# Patient Record
Sex: MALE | Race: WHITE | NOT HISPANIC OR LATINO | Employment: FULL TIME | URBAN - METROPOLITAN AREA
[De-identification: names, ages, dates, MRNs, and addresses within clinical notes are randomized per-mention and may not be internally consistent; named-entity substitution may affect disease eponyms.]

---

## 2018-07-11 ENCOUNTER — OFFICE VISIT - HEALTHEAST (OUTPATIENT)
Dept: FAMILY MEDICINE | Facility: CLINIC | Age: 23
End: 2018-07-11

## 2018-07-11 DIAGNOSIS — B07.8 OTHER VIRAL WARTS: ICD-10-CM

## 2018-07-11 ASSESSMENT — MIFFLIN-ST. JEOR: SCORE: 1990.29

## 2018-07-27 ENCOUNTER — OFFICE VISIT - HEALTHEAST (OUTPATIENT)
Dept: FAMILY MEDICINE | Facility: CLINIC | Age: 23
End: 2018-07-27

## 2018-07-27 DIAGNOSIS — B07.8 OTHER VIRAL WARTS: ICD-10-CM

## 2018-07-27 DIAGNOSIS — S90.121A CONTUSION OF LESSER TOE OF RIGHT FOOT WITHOUT DAMAGE TO NAIL, INITIAL ENCOUNTER: ICD-10-CM

## 2018-07-27 ASSESSMENT — MIFFLIN-ST. JEOR: SCORE: 2004.81

## 2019-04-12 ENCOUNTER — OFFICE VISIT - HEALTHEAST (OUTPATIENT)
Dept: FAMILY MEDICINE | Facility: CLINIC | Age: 24
End: 2019-04-12

## 2019-04-12 DIAGNOSIS — Z79.899 CONTROLLED SUBSTANCE AGREEMENT SIGNED: ICD-10-CM

## 2019-04-12 DIAGNOSIS — F98.8 ATTENTION DEFICIT DISORDER (ADD) IN ADULT: ICD-10-CM

## 2019-05-21 ENCOUNTER — OFFICE VISIT - HEALTHEAST (OUTPATIENT)
Dept: FAMILY MEDICINE | Facility: CLINIC | Age: 24
End: 2019-05-21

## 2019-05-21 DIAGNOSIS — F98.8 ATTENTION DEFICIT DISORDER (ADD) WITHOUT HYPERACTIVITY: ICD-10-CM

## 2019-05-21 RX ORDER — DEXTROAMPHETAMINE SACCHARATE, AMPHETAMINE ASPARTATE, DEXTROAMPHETAMINE SULFATE AND AMPHETAMINE SULFATE 3.75; 3.75; 3.75; 3.75 MG/1; MG/1; MG/1; MG/1
15 TABLET ORAL DAILY
Qty: 30 TABLET | Refills: 0 | Status: SHIPPED | OUTPATIENT
Start: 2019-05-21

## 2019-06-19 ENCOUNTER — OFFICE VISIT - HEALTHEAST (OUTPATIENT)
Dept: BEHAVIORAL HEALTH | Facility: CLINIC | Age: 24
End: 2019-06-19

## 2019-06-19 DIAGNOSIS — F98.8 ATTENTION DEFICIT DISORDER (ADD) IN ADULT: ICD-10-CM

## 2019-06-19 DIAGNOSIS — F43.20 ADJUSTMENT DISORDER, UNSPECIFIED: ICD-10-CM

## 2020-01-08 ENCOUNTER — AMBULATORY - HEALTHEAST (OUTPATIENT)
Dept: BEHAVIORAL HEALTH | Facility: CLINIC | Age: 25
End: 2020-01-08

## 2021-05-26 ENCOUNTER — RECORDS - HEALTHEAST (OUTPATIENT)
Dept: ADMINISTRATIVE | Facility: CLINIC | Age: 26
End: 2021-05-26

## 2021-05-27 NOTE — PROGRESS NOTES
"ASSESSMENT/PLAN:  1. Attention deficit disorder (ADD) in adult  Ambulatory referral to Psychology    dextroamphetamine-amphetamine (ADDERALL XR) 20 MG 24 hr capsule   2. Controlled substance agreement signed         This is a 22 yo male, trying to finish his education degree at the [employer].  He is finding that his inability to focus/concentrate is making it difficult to finish.  He has tried stimulants in the past to focus - reports this was helpful.  We discussed treating \"the right diagnosis\" and I'm insistent that he get an appointment for an assessment.  In the meanwhile I will start Adderall XR to see if we can help in the short-term.  He has signed a non-opioid agreement.    Return in about 1 month (around 5/12/2019).    We have spent a total of 25 minutes together today - all of this time in counseling regarding his (presumed) ADHD symptoms  There are no discontinued medications.  There are no Patient Instructions on file for this visit.    Chief Complaint:  Chief Complaint   Patient presents with     ADHD     pt states possible ADHD, has a hard time focusing        HPI:   Placido Daredn is a 23 y.o. 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  Throughout undergraduate studies - would use Adderall  -   Hasn't used anything for the last year  Deadlines for everything - really late on everything  Huge impact in student teaching  Can't complete this massive project - nothing gets done    Last week was spring break - planned task 1 for break - nothing got done  Went to his parents for help - " "like days of old - even that didn't help    Lots of lesson planning  Daily/weekly routine is thrown off  Exercise is \"off\" - was going to lift 2 hours, 4 times/week  Hasn't done any of that - inability to focus and complete work    Never had testing  In middle school /high school - felt like symptoms applied to him  Got his work done -   Didn't want to rely on it              PMH:   Patient Active Problem List    Diagnosis Date Noted     Other viral warts 07/11/2018     History reviewed. No pertinent past medical history.  History reviewed. No pertinent surgical history.  Social History     Socioeconomic History     Marital status: Single     Spouse name: Not on file     Number of children: Not on file     Years of education: Not on file     Highest education level: Not on file   Occupational History     Not on file   Social Needs     Financial resource strain: Not on file     Food insecurity:     Worry: Not on file     Inability: Not on file     Transportation needs:     Medical: Not on file     Non-medical: Not on file   Tobacco Use     Smoking status: Never Smoker     Smokeless tobacco: Never Used   Substance and Sexual Activity     Alcohol use: Not on file     Drug use: Not on file     Sexual activity: Not on file   Lifestyle     Physical activity:     Days per week: Not on file     Minutes per session: Not on file     Stress: Not on file   Relationships     Social connections:     Talks on phone: Not on file     Gets together: Not on file     Attends Mosque service: Not on file     Active member of club or organization: Not on file     Attends meetings of clubs or organizations: Not on file     Relationship status: Not on file     Intimate partner violence:     Fear of current or ex partner: Not on file     Emotionally abused: Not on file     Physically abused: Not on file     Forced sexual activity: Not on file   Other Topics Concern     Not on file   Social History Narrative     Not on file     History " reviewed. No pertinent family history.    Meds:    Current Outpatient Medications:      dextroamphetamine-amphetamine (ADDERALL XR) 20 MG 24 hr capsule, Take 1 capsule (20 mg total) by mouth daily., Disp: 30 capsule, Rfl: 0    Allergies:  No Known Allergies    ROS:  Pertinent positives as noted in HPI; otherwise 12 point ROS negative.      Physical Exam:  EXAM:  /70 (Patient Site: Left Arm, Patient Position: Sitting, Cuff Size: Adult Regular)   Pulse (!) 104   Resp 20   Wt 178 lb 8 oz (81 kg)   BMI 23.23 kg/m     Gen:  NAD, appears well, well-hydrated  HEENT:  TMs nl, oropharynx benign, nasal mucosa nl, conjunctiva clear  Neck:  Supple, no adenopathy, no thyromegaly, no carotid bruits, no JVD  Lungs:  Clear to auscultation bilaterally  Cor:  RRR no murmur  Abd:  Soft, nontender, BS+, no masses, no guarding or rebound, no HSM  Extr:  Neg.  Neuro:  No asymmetry  Skin:  Warm/dry        Results:  Results for orders placed or performed in visit on 06/27/14   Rapid Strep A Screen-Throat   Result Value Ref Range    Rapid Strep A Antigen No Group A Strep detected No Group A Strep detected   Group A Strep, RNA Direct Detection, Throat   Result Value Ref Range    Group A Strep by PCR No Group A Strep rRNA detected No Group A Strep rRNA detected

## 2021-05-29 ENCOUNTER — RECORDS - HEALTHEAST (OUTPATIENT)
Dept: ADMINISTRATIVE | Facility: CLINIC | Age: 26
End: 2021-05-29

## 2021-05-29 NOTE — PROGRESS NOTES
"ASSESSMENT/PLAN:  1. 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  He now is uncertain what he'll do beyond that.  He has been using the Adderall XR but feels like this is too large a dose - feels like he wasn't sleeping well with this dose - and felt like it lasted too long.  We had a long discussion about decreasing dose/changing to short acting tablets (either/and).  After this, we agreed upon lower dose (15 mg) and short acting tablet.  He agrees to this change.  He will also follow through with his evaluation with psychologist regarding this diagnosis.  We will see him in follow up after that visit.   Return in about 6 weeks (around 7/2/2019).      Medications Discontinued During This Encounter   Medication Reason     dextroamphetamine-amphetamine (ADDERALL XR) 20 MG 24 hr capsule Alternate therapy     There are no Patient Instructions on file for this visit.    Chief Complaint:  Chief Complaint   Patient presents with     Medication follow up       HPI:   Placido Darden is a 23 y.o. 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 No pertinent past medical history.  History reviewed. No pertinent surgical history.  Social History     Socioeconomic History     Marital status: Single     Spouse name: Not on file     Number of children: Not on file     Years of education: Not on file     Highest education level: Not on file   Occupational History     Not on file   Social Needs     Financial resource strain: Not on file     Food insecurity:     Worry: Not on file     Inability: Not on file     Transportation needs:     Medical: Not on file     Non-medical: Not on file   Tobacco Use     Smoking status: Never Smoker     Smokeless tobacco: Never Used   Substance and Sexual Activity     Alcohol use: Not on file     Drug use: Not on file     Sexual activity: Not on file   Lifestyle     Physical activity:     Days per week: Not on file     Minutes per session: Not on file     Stress: Not on file   Relationships     Social connections:     Talks on phone: Not on file     Gets together: Not on file     Attends Caodaism service: Not on file     Active member of club or organization: Not on file     Attends meetings of clubs or organizations: Not on file     Relationship status: Not on file     Intimate partner violence:     Fear of current or ex partner: Not on file     Emotionally abused: Not on file     Physically abused: Not on file     Forced sexual activity: Not on file   Other Topics Concern     Not on file   Social History Narrative     Not on file     History reviewed. No pertinent family history.    Meds:    Current Outpatient Medications:      dextroamphetamine-amphetamine 15 mg Tab, Take 15 mg by mouth daily., Disp: 30 tablet, Rfl: 0    Allergies:  No Known Allergies    ROS:  Pertinent positives  as noted in HPI; otherwise 12 point ROS negative.      Physical Exam:  EXAM:  /50 (Patient Site: Right Arm, Patient Position: Sitting, Cuff Size: Adult Regular)   Pulse 88   Wt 182 lb 1 oz (82.6 kg)   BMI 23.69 kg/m     Gen:  NAD, appears well, well-hydrated, anxious  HEENT:  TMs nl, oropharynx benign, nasal mucosa nl, conjunctiva clear  Neck:  Supple, no adenopathy, no thyromegaly, no carotid bruits, no JVD  Lungs:  Clear to auscultation bilaterally  Cor:  RRR no murmur  Abd:  Soft, nontender, BS+, no masses, no guarding or rebound, no HSM  Extr:  Neg.  Neuro:  No asymmetry  Skin:  Warm/dry        Results:

## 2021-05-30 NOTE — PROGRESS NOTES
"Mental Health Visit Note    6/19/19  Start time:1100     Stop Time:1150    Session # 1    Session Type: Patient is presenting for an Individual session.    Individuals present Patient and Therapist    Patient is a 23-year-old male who was referred by Dr.Charlene Hughes for evaluation of current issues and participation in Psychotherapy. 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 I am having trouble focusing on, working on completing some school related tasks. Now I'm not certain what I should be doing.\"  .   New symptoms or complaints: Procrastination, anxiousness, difficulty remembering appointments, disorganization, difficulty paying attention, lack of follow-through, difficulty concentrating, difficulty unwinding easily distracted, trouble falling asleep, feeling tired and having little energy.     Patient reports that many of the symptoms have been long-standing although not as problematic until structure and routine was inconsistent.  Patient also states that school was never very difficult for him and he would rally at the end to complete assignments.    PHQ-9 scoring 2 indicating minimal depression  SOLITARIO-7 scoring 0 indicating no anxiety  CAGE 0/4  Adult ADHD self-report scale (ASRS-v1.1) indicating high probability of ADHD and further investigation is warranted.    Please note the self-report scales will be scanned into the electronic medical record.     WHODAS 2.0 12-item version: 0    Scores presented in qualifiers to represent level of disability.   NO Problem (none, absent, negligible,...) 0-4%    H1= 0  H2= 0  H3= 0  Functional Impairment:   Personal: 2  Family: 1  Work: 1  Social:2    Clinical assessment of mental status  Grooming: Well groomed  Attire: Appropriate  Age: Appears Stated  Behavior Towards Examiner: Cooperative  Motor Activity: Within normal   Eye Contact: Appropriate  Mood: " "Euthymic  Affect: Congruent w/content of speech  Speech/Language: Within normal  Attention: Within normal  Concentration: Within normal  Thought Process: Within normal  Thought Content: Hallucinations: Within noraml  Delusions: Within normal  Orientation: X 3  Memory: No Evidence of Impairment  Judgement: No Evidence of Impairment  Estimated Intelligence: Average  Demonstrated Insight: Adequate  Fund of Knowledge: adequate      Suicidal/Homicidal Ideation present: None Reported This Session     Patient's impression of their current status:   The patient described reasons for engaging in therapy services during today's session because 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 The patient believes that the symptoms began \"most likely years ago but having more problems lately.\" The patient has engaged in psychotherapy services before.  He stated, that he was evaluated (testing) in fifth or sixth grade but he is uncertain as to the reason. Patient described the following expectations for treatment: The\" patient is hopeful that he will be able to find it \"easier to begin work on and complete tasks.\"    Therapist impression of patients current state:   The patient presented for an initial intake session with this provider. Patient is a 23 year old male. Patient was referred by Dr. Fartun Hughes to engage in individual psychotherapy to address symptoms of difficulty focusing, concentrating, follow-through, distractibility, trouble falling asleep feeling tired and having little energy.   Patient stated, that he was not certain whether or not teaching was what he wanted to do anyway.  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 He is contemplating going back to school to complete a different path rather than teaching.  Patient stated \"I just want a job where " "I can leave it at work and not have to worry about doing things after that time to prepare for the next day.\"   Patient appears to meet  DSM-Criteria for Adjustment Disorder Unspecified being that he recently make a decision impacting his future.Patient does not appear to be experiencing anxiety and minimal depression.   And a Provisional Diagnosis  for Adult ADHD, with the symptoms of: procrastination, anxiousness, difficulty remembering appointments, disorganization, difficulty paying attention, lack of follow-through, difficulty concentrating, difficulty unwinding easily distracted, trouble falling asleep, feeling tired and having little energy and patient indicating the symptoms have been going on for an extensive period of time and is affected every area of his life.      The patient appeared cooperative and open-minded throughout the intake and easily engaged in dialogue. Therapist and patient reviewed consent and privacy policy. Patient reported understanding and signed document- sent to be scanned into EMR. The patient has not engaged in outpatient psychotherapy services in the community so there is no diagnostic assessment on file or available although, reports that he was \"tested in the fifth grade but I cannot remember why.\" The patient completed the following questionnaires for session today: WHODAS, CAGE PHQ-9 and SOLITARIO-7.  Therapist and patient completed C-SSRS together in session. No concerns about patient's safety currently or the safety of others per assessment today.  Although, had suicidal thoughts when he was 10 years of age.  No intent.   Therapist and patient will further review patient's history during the next follow-up encounter in order to complete a standard diagnostic assessment.  Patient uncertain as to whether or not he is wanting to come back for therapy.  Will let therapist know within the next few weeks.  Patient stated, \"I don't  know if I really need my Adderall, I am not really in a " "job that I have to really focus hard on. I will let my doctor know what I wanted to.\"  The patient plans to follow-up with psychotropic medication management with her PCP. Patient did not request psychiatry services at intake.      Type of psychotherapeutic technique provided: Insight oriented and Solution-focused    Progress toward short term goals: Not established today    Review of long term goals: Not established today    Diagnosis   Adjustment Disorder Unspecified    Provisional   ADHD       Plan and Follow up:   Patient stated,  that after the summer he may consider coming back for further evaluation and testing.    Discharge Criteria/Planning: Patient will continue with follow-up until therapy can be discontinued without return of signs and symptoms.     Hodan Mcgregor LICSW  6/19/19      "

## 2021-06-01 VITALS — WEIGHT: 211.2 LBS | BODY MASS INDEX: 27.11 KG/M2 | HEIGHT: 74 IN

## 2021-06-01 VITALS — WEIGHT: 208 LBS | HEIGHT: 74 IN | BODY MASS INDEX: 26.69 KG/M2

## 2021-06-02 VITALS — WEIGHT: 182.06 LBS | BODY MASS INDEX: 23.69 KG/M2

## 2021-06-02 VITALS — BODY MASS INDEX: 23.23 KG/M2 | WEIGHT: 178.5 LBS

## 2021-06-05 NOTE — PROGRESS NOTES
Letter will be sent regarding Therapist departure. Chart closed.    Hodan Mcgregor [facility]  1/8/20

## 2021-06-16 PROBLEM — B07.8 OTHER VIRAL WARTS: Status: ACTIVE | Noted: 2018-07-11

## 2021-06-16 PROBLEM — F98.8 ATTENTION DEFICIT DISORDER (ADD): Status: ACTIVE | Noted: 2019-05-21

## 2021-06-19 NOTE — PROGRESS NOTES
1. Other viral warts         Plan: We did treated with cryotherapy in a freeze thaw manner for each after shaving off the top layer with a 15 blade scalpel.  He tolerated very well and she had no problems or complications.  He will let us know if in 2-3 weeks he still has some residual wart left we can retreat him as necessary call with any questions that he might have.    Subjective: 22-year-old male who is here today for warts treatment.  He states that he has had a wart on his right index finger for a few months now, it is getting slightly bigger over time.  He has not tried any over-the-counter treatments for it.  He also has one on the bottom of his right foot which he believes is newer and has grown over the last month or so as well.    Objective: Well-appearing male in no acute distress.  Vital signs as noted.  The warts are as described above.  Each was shaved with a 15 blade scalpel and frozen with cryotherapy.

## 2021-06-19 NOTE — LETTER
Letter by Fartun Hughes MD at      Author: Fartun Hughes MD Service: -- Author Type: --    Filed:  Encounter Date: 4/12/2019 Status: (Other)         [facility] FAMILY MEDICINE/OB  04/12/19    Patient: Placido Darden  YOB: 1995  Medical Record Number: [id number]  CSN: [id number]                                                                              Non-opioid Controlled Substance Agreement    I understand that my care provider has prescribed a controlled substance to help manage my condition(s). I am taking this medicine to help me function or work. I know this is strong medicine, and that it can cause serious side effects. Controlled substances can be sedating, addicting and may cause a dependency on the drug. They can affect my ability to drive or think, and cause depression. They need to be taken exactly as prescribed. Combining controlled substances with certain medicines or chemicals (such as cocaine, sedatives and tranquilizers, sleeping pills, meth) can be dangerous or even fatal. Also, if I stop controlled substances suddenly, I may have severe withdrawal symptoms.  If not helpful, I may be asked to stop them.    The risks, benefits, and side effects of these medicine(s) were explained to me. I agree that:    1. I will take part in other treatments as advised by my care team. This may be psychiatry or counseling, physical therapy, behavioral therapy, group treatment or a referral to a pain clinic. I will reduce or stop my medicine when my care team tells me to do so.  2. I will take my medicines as prescribed. I will not change the dose or schedule unless my care team tells me to. There will be no refills if I run out early.  I may be contactedwithout warning and asked to complete a urine drug test or pill count at any time.   3. I will keep all my appointments, and understand this is part of the monitoring of controlled substances. My care team may  require an office visit for EVERY controlled substance refill. If I miss appointments or dont follow instructions, my care team may stop my medicine.  4. I will not ask other providers to prescribe controlled substances, and I will not accept controlled substances from other people. If I need another prescribed controlled substance for a new reason, I will tell my care team within 1 business day.  5. I will use one pharmacy to fill all of my controlled substance prescriptions, and it is up to me to make sure that I do not run out of my medicines on weekends or holidays. If my care team is willing to refill my controlled substance prescription without a visit, I must request refills only during office hours, refills may take up to 3 days to process, and it may take up to 5 to 7 days for my medicine to be mailed and ready at my pharmacy. Prescriptions will not be mailed anywhere except my pharmacy.    6. I am responsible for my prescriptions. If the medicine/prescription is lost or stolen, it will not be replaced. I also agree not to share controlled substance medicines with anyone.          [facility] FAMILY MEDICINE/OB  04/12/19  Patient:  Placido Darden  YOB: 1995  Medical Record Number: [id number]  CSN: [id number]    7. I agree to not use ANY illegal or recreational drugs. This includes marijuana, cocaine, bath salts or other drugs. I agree not to use alcohol unless my care team says I may. I agree to give urine samples whenever asked. If I dont give a urine sample, the care team may stop my medicine.    8. If I enroll in the [place] Medical Marijuana program, I will tell my care team. I will also sign an agreement to share my medical records with my care team.    9. I will bring in my list of medicines (or my medicine bottles) each time I come to the clinic.   10. I will tell my care team right away if I become pregnant or have a new medical problem treated outside of my regular  clinic.  11. I understand that this medicine can affect my thinking and judgment. It may be unsafe for me to drive, use machinery and do dangerous tasks. I will not do any of these things until I know how the medicine affects me. If my dose changes, I will wait to see how it affects me. I will contact my care team if I have concerns about medicine side effects.    I understand that if I do not follow any of the conditions above, my prescriptions or treatment may be stopped.      I agree that my provider, clinic care team, and pharmacy may work with any city, state or federal law enforcement agency that investigates the misuse, sale, or other diversion of my controlled medicine. I will allow my provider to discuss my care with or share a copy of this agreement with any other treating provider, pharmacy or emergency room where I receive care. I agree to give up (waive) any right of privacy or confidentiality with respect to these consents.   I have read this agreement and have asked questions about anything I did not understand.    ___________________________________________________________________________  Patient signature - Date/Time  -Placido Darden                                      ___________________________________________________________________________  Witness signature                                                                    ___________________________________________________________________________  Provider signature- Fartun Hughes MD

## 2021-06-19 NOTE — PROGRESS NOTES
1. Other viral warts     2. Contusion of lesser toe of right foot without damage to nail, initial encounter        Plan: Warts were shaved off with a 15 blade scalpel and treated with cryotherapy in a freeze thaw manner times for each.  Tolerated this very well neuro problems or complications.  He will return in 2-3 weeks again if they are still present.    I reassured him that it toe does not seem to be broken, and I would just have him buddy tape the toe as needed for comfort.  He can use Tylenol and/or ibuprofen as well.  The swelling seems to be going down and he is getting better.  He can use ice it but does bother him as well.    Subjective 22-year-old male who is here today with a couple of things.  He treated a couple of warts a few weeks ago and have gotten better and quite a bit smaller but still present.  One is on his right hand and the other is on the arch of his right foot.    Also a few days ago he stubbed his right fifth toe on a table leg.  He had quite a bit of pain right away.  The pain is mostly at the tip of the toe, is not into the metatarsal area of the foot.  Redness anymore.  The swelling is gone down.  Is less painful to walk on.  There is no deformity.  He did not hear a pop or a snap.

## 2022-01-30 ENCOUNTER — HEALTH MAINTENANCE LETTER (OUTPATIENT)
Age: 27
End: 2022-01-30

## 2022-08-11 ENCOUNTER — OFFICE VISIT (OUTPATIENT)
Dept: URGENT CARE | Facility: URGENT CARE | Age: 27
End: 2022-08-11

## 2022-08-11 DIAGNOSIS — Z53.9 DIAGNOSIS NOT YET DEFINED: Primary | ICD-10-CM

## 2022-09-25 ENCOUNTER — HEALTH MAINTENANCE LETTER (OUTPATIENT)
Age: 27
End: 2022-09-25

## 2023-05-13 ENCOUNTER — HEALTH MAINTENANCE LETTER (OUTPATIENT)
Age: 28
End: 2023-05-13

## 2024-07-20 ENCOUNTER — HEALTH MAINTENANCE LETTER (OUTPATIENT)
Age: 29
End: 2024-07-20

## 2025-08-09 ENCOUNTER — HEALTH MAINTENANCE LETTER (OUTPATIENT)
Age: 30
End: 2025-08-09